# Patient Record
Sex: MALE | Race: WHITE | Employment: UNEMPLOYED | URBAN - METROPOLITAN AREA
[De-identification: names, ages, dates, MRNs, and addresses within clinical notes are randomized per-mention and may not be internally consistent; named-entity substitution may affect disease eponyms.]

---

## 2023-04-05 ENCOUNTER — HOSPITAL ENCOUNTER (EMERGENCY)
Age: 23
Discharge: HOME | End: 2023-04-05
Attending: STUDENT IN AN ORGANIZED HEALTH CARE EDUCATION/TRAINING PROGRAM
Payer: MEDICAID

## 2023-04-05 ENCOUNTER — APPOINTMENT (OUTPATIENT)
Dept: CT IMAGING | Age: 23
End: 2023-04-05
Payer: MEDICAID

## 2023-04-05 VITALS
OXYGEN SATURATION: 97 % | BODY MASS INDEX: 18.83 KG/M2 | RESPIRATION RATE: 20 BRPM | HEART RATE: 95 BPM | DIASTOLIC BLOOD PRESSURE: 81 MMHG | HEIGHT: 67 IN | TEMPERATURE: 100.8 F | WEIGHT: 120 LBS | SYSTOLIC BLOOD PRESSURE: 120 MMHG

## 2023-04-05 DIAGNOSIS — U07.1 COVID-19: Primary | ICD-10-CM

## 2023-04-05 LAB
FLUAV RNA SPEC QL NAA+PROBE: NOT DETECTED
FLUBV RNA SPEC QL NAA+PROBE: NOT DETECTED
SARS-COV-2 RDRP RESP QL NAA+PROBE: DETECTED
SOURCE: ABNORMAL

## 2023-04-05 PROCEDURE — 70450 CT HEAD/BRAIN W/O DYE: CPT

## 2023-04-05 PROCEDURE — 87635 SARS-COV-2 COVID-19 AMP PRB: CPT

## 2023-04-05 PROCEDURE — 87502 INFLUENZA DNA AMP PROBE: CPT

## 2023-04-05 PROCEDURE — 99284 EMERGENCY DEPT VISIT MOD MDM: CPT

## 2023-04-05 NOTE — DISCHARGE INSTRUCTIONS
Continue with symptomatic treatment at home with Tylenol and ibuprofen. Your COVID swab came back positive.

## 2023-04-05 NOTE — ED NOTES
I have reviewed discharge instructions with the patient. The patient verbalized understanding. Patient left ED via Discharge Method: ambulatory to Home with (Self). Opportunity for questions and clarification provided. Patient given 0 scripts. To continue your aftercare when you leave the hospital, you may receive an automated call from our care team to check in on how you are doing. This is a free service and part of our promise to provide the best care and service to meet your aftercare needs.  If you have questions, or wish to unsubscribe from this service please call [phone]. Thank you for Choosing our [facility] Emergency Department.         Edward Irizarry RN  04/05/23 3093

## 2023-04-05 NOTE — ED TRIAGE NOTES
Pt ambulatory to triage c/o headache x's 3 weeks that worsened yesterday with Nausea. Sibling also sick with fevers.   (+) Fevers, Nausea   (-) V/D

## 2023-04-05 NOTE — ED NOTES
I have reviewed discharge instructions with the patient. The patient verbalized understanding. Patient left ED via Discharge Method: ambulatory to Home with (Family). Opportunity for questions and clarification provided. Patient given 0 scripts. To continue your aftercare when you leave the hospital, you may receive an automated call from our care team to check in on how you are doing. This is a free service and part of our promise to provide the best care and service to meet your aftercare needs.  If you have questions, or wish to unsubscribe from this service please call [phone]. Thank you for Choosing our [facility] Emergency Department.         Margi Rehman RN  04/05/23 2072

## 2023-04-05 NOTE — ED PROVIDER NOTES
Not detected NOTD      Influenza B, NICHOLAS Not detected NOTD     CT HEAD WO CONTRAST    Narrative    STUDY: CT HEAD WO CONTRAST [id number]     STUDY DATE: 4/5/2023 3:51 PM     HISTORY: head ache     TECHNIQUE: Contiguous axial CT images were obtained of the head without  intravenous contrast. Coronal and sagittal reconstructions were performed. Radiation dose reduction techniques were used for this study: All CT scans  performed at this facility use one or all of the following: Automated exposure  control, adjustment of the mA and/or kVp according to patient's size, iterative  reconstruction. CONTRAST: None. COMPARISON:  None available. FINDINGS:  HEMORRHAGE: None. BRAIN PARENCHYMA: Grey-white matter differentiation is maintained. WHITE MATTER: Within normal limits for the patient's age. VENTRICLES: Normal in size and morphology. CALVARIUM: No fracture. VASCULATURE: Within normal limits. GLOBES AND ORBITS: Within normal limits. SINUSES: Clear. Impression    No evidence of acute ischemia, hemorrhage, or mass effect. CT HEAD WO CONTRAST   Final Result      No evidence of acute ischemia, hemorrhage, or mass effect. Voice dictation software was used during the making of this note. This software is not perfect and grammatical and other typographical errors may be present. This note has not been completely proofread for errors.      Kurtis Purvisma  04/05/23 4706